# Patient Record
Sex: MALE | Race: WHITE | NOT HISPANIC OR LATINO | URBAN - METROPOLITAN AREA
[De-identification: names, ages, dates, MRNs, and addresses within clinical notes are randomized per-mention and may not be internally consistent; named-entity substitution may affect disease eponyms.]

---

## 2020-03-19 ENCOUNTER — EMERGENCY (EMERGENCY)
Facility: HOSPITAL | Age: 35
LOS: 1 days | Discharge: HOME | End: 2020-03-19
Attending: EMERGENCY MEDICINE
Payer: COMMERCIAL

## 2020-03-19 VITALS
SYSTOLIC BLOOD PRESSURE: 139 MMHG | DIASTOLIC BLOOD PRESSURE: 76 MMHG | RESPIRATION RATE: 18 BRPM | OXYGEN SATURATION: 98 % | HEART RATE: 115 BPM

## 2020-03-19 VITALS
DIASTOLIC BLOOD PRESSURE: 80 MMHG | HEART RATE: 90 BPM | RESPIRATION RATE: 17 BRPM | TEMPERATURE: 99 F | SYSTOLIC BLOOD PRESSURE: 121 MMHG | OXYGEN SATURATION: 100 %

## 2020-03-19 LAB
ALBUMIN SERPL ELPH-MCNC: 4.8 G/DL — SIGNIFICANT CHANGE UP (ref 3.3–5)
ALP SERPL-CCNC: 62 U/L — SIGNIFICANT CHANGE UP (ref 40–120)
ALT FLD-CCNC: 30 U/L — SIGNIFICANT CHANGE UP (ref 10–45)
ANION GAP SERPL CALC-SCNC: 16 MMOL/L — SIGNIFICANT CHANGE UP (ref 5–17)
AST SERPL-CCNC: 27 U/L — SIGNIFICANT CHANGE UP (ref 10–40)
BASOPHILS # BLD AUTO: 0.03 K/UL — SIGNIFICANT CHANGE UP (ref 0–0.2)
BASOPHILS NFR BLD AUTO: 0.4 % — SIGNIFICANT CHANGE UP (ref 0–2)
BILIRUB SERPL-MCNC: 0.4 MG/DL — SIGNIFICANT CHANGE UP (ref 0.2–1.2)
BUN SERPL-MCNC: 11 MG/DL — SIGNIFICANT CHANGE UP (ref 7–23)
CALCIUM SERPL-MCNC: 10.3 MG/DL — SIGNIFICANT CHANGE UP (ref 8.4–10.5)
CHLORIDE SERPL-SCNC: 97 MMOL/L — SIGNIFICANT CHANGE UP (ref 96–108)
CO2 SERPL-SCNC: 24 MMOL/L — SIGNIFICANT CHANGE UP (ref 22–31)
CREAT SERPL-MCNC: 1.14 MG/DL — SIGNIFICANT CHANGE UP (ref 0.5–1.3)
EOSINOPHIL # BLD AUTO: 0.03 K/UL — SIGNIFICANT CHANGE UP (ref 0–0.5)
EOSINOPHIL NFR BLD AUTO: 0.4 % — SIGNIFICANT CHANGE UP (ref 0–6)
GAS PNL BLDV: SIGNIFICANT CHANGE UP
GLUCOSE SERPL-MCNC: 97 MG/DL — SIGNIFICANT CHANGE UP (ref 70–99)
HCT VFR BLD CALC: 43.7 % — SIGNIFICANT CHANGE UP (ref 39–50)
HGB BLD-MCNC: 14.5 G/DL — SIGNIFICANT CHANGE UP (ref 13–17)
IMM GRANULOCYTES NFR BLD AUTO: 0.3 % — SIGNIFICANT CHANGE UP (ref 0–1.5)
LYMPHOCYTES # BLD AUTO: 0.56 K/UL — LOW (ref 1–3.3)
LYMPHOCYTES # BLD AUTO: 7.7 % — LOW (ref 13–44)
MCHC RBC-ENTMCNC: 30.3 PG — SIGNIFICANT CHANGE UP (ref 27–34)
MCHC RBC-ENTMCNC: 33.2 GM/DL — SIGNIFICANT CHANGE UP (ref 32–36)
MCV RBC AUTO: 91.2 FL — SIGNIFICANT CHANGE UP (ref 80–100)
MONOCYTES # BLD AUTO: 1.04 K/UL — HIGH (ref 0–0.9)
MONOCYTES NFR BLD AUTO: 14.3 % — HIGH (ref 2–14)
NEUTROPHILS # BLD AUTO: 5.57 K/UL — SIGNIFICANT CHANGE UP (ref 1.8–7.4)
NEUTROPHILS NFR BLD AUTO: 76.9 % — SIGNIFICANT CHANGE UP (ref 43–77)
NRBC # BLD: 0 /100 WBCS — SIGNIFICANT CHANGE UP (ref 0–0)
PLATELET # BLD AUTO: 170 K/UL — SIGNIFICANT CHANGE UP (ref 150–400)
POTASSIUM SERPL-MCNC: 3.5 MMOL/L — SIGNIFICANT CHANGE UP (ref 3.5–5.3)
POTASSIUM SERPL-SCNC: 3.5 MMOL/L — SIGNIFICANT CHANGE UP (ref 3.5–5.3)
PROT SERPL-MCNC: 7.9 G/DL — SIGNIFICANT CHANGE UP (ref 6–8.3)
RAPID RVP RESULT: SIGNIFICANT CHANGE UP
RBC # BLD: 4.79 M/UL — SIGNIFICANT CHANGE UP (ref 4.2–5.8)
RBC # FLD: 12.5 % — SIGNIFICANT CHANGE UP (ref 10.3–14.5)
SODIUM SERPL-SCNC: 137 MMOL/L — SIGNIFICANT CHANGE UP (ref 135–145)
WBC # BLD: 7.25 K/UL — SIGNIFICANT CHANGE UP (ref 3.8–10.5)
WBC # FLD AUTO: 7.25 K/UL — SIGNIFICANT CHANGE UP (ref 3.8–10.5)

## 2020-03-19 PROCEDURE — 82435 ASSAY OF BLOOD CHLORIDE: CPT

## 2020-03-19 PROCEDURE — 82330 ASSAY OF CALCIUM: CPT

## 2020-03-19 PROCEDURE — 85027 COMPLETE CBC AUTOMATED: CPT

## 2020-03-19 PROCEDURE — 71045 X-RAY EXAM CHEST 1 VIEW: CPT

## 2020-03-19 PROCEDURE — 83605 ASSAY OF LACTIC ACID: CPT

## 2020-03-19 PROCEDURE — 99284 EMERGENCY DEPT VISIT MOD MDM: CPT | Mod: 25

## 2020-03-19 PROCEDURE — 96361 HYDRATE IV INFUSION ADD-ON: CPT

## 2020-03-19 PROCEDURE — 99285 EMERGENCY DEPT VISIT HI MDM: CPT

## 2020-03-19 PROCEDURE — 84295 ASSAY OF SERUM SODIUM: CPT

## 2020-03-19 PROCEDURE — 87581 M.PNEUMON DNA AMP PROBE: CPT

## 2020-03-19 PROCEDURE — 80053 COMPREHEN METABOLIC PANEL: CPT

## 2020-03-19 PROCEDURE — 93010 ELECTROCARDIOGRAM REPORT: CPT

## 2020-03-19 PROCEDURE — 87486 CHLMYD PNEUM DNA AMP PROBE: CPT

## 2020-03-19 PROCEDURE — 84132 ASSAY OF SERUM POTASSIUM: CPT

## 2020-03-19 PROCEDURE — 93005 ELECTROCARDIOGRAM TRACING: CPT | Mod: 76

## 2020-03-19 PROCEDURE — 82803 BLOOD GASES ANY COMBINATION: CPT

## 2020-03-19 PROCEDURE — 85014 HEMATOCRIT: CPT

## 2020-03-19 PROCEDURE — 71045 X-RAY EXAM CHEST 1 VIEW: CPT | Mod: 26

## 2020-03-19 PROCEDURE — 82947 ASSAY GLUCOSE BLOOD QUANT: CPT

## 2020-03-19 PROCEDURE — 87798 DETECT AGENT NOS DNA AMP: CPT

## 2020-03-19 PROCEDURE — 87633 RESP VIRUS 12-25 TARGETS: CPT

## 2020-03-19 PROCEDURE — 96360 HYDRATION IV INFUSION INIT: CPT

## 2020-03-19 RX ORDER — ACETAMINOPHEN 500 MG
975 TABLET ORAL ONCE
Refills: 0 | Status: COMPLETED | OUTPATIENT
Start: 2020-03-19 | End: 2020-03-19

## 2020-03-19 RX ORDER — SODIUM CHLORIDE 9 MG/ML
1000 INJECTION, SOLUTION INTRAVENOUS ONCE
Refills: 0 | Status: COMPLETED | OUTPATIENT
Start: 2020-03-19 | End: 2020-03-19

## 2020-03-19 RX ORDER — SODIUM CHLORIDE 9 MG/ML
2000 INJECTION, SOLUTION INTRAVENOUS ONCE
Refills: 0 | Status: COMPLETED | OUTPATIENT
Start: 2020-03-19 | End: 2020-03-19

## 2020-03-19 RX ORDER — IBUPROFEN 200 MG
400 TABLET ORAL ONCE
Refills: 0 | Status: COMPLETED | OUTPATIENT
Start: 2020-03-19 | End: 2020-03-19

## 2020-03-19 RX ADMIN — SODIUM CHLORIDE 2000 MILLILITER(S): 9 INJECTION, SOLUTION INTRAVENOUS at 18:30

## 2020-03-19 RX ADMIN — Medication 400 MILLIGRAM(S): at 21:50

## 2020-03-19 RX ADMIN — Medication 975 MILLIGRAM(S): at 18:30

## 2020-03-19 RX ADMIN — SODIUM CHLORIDE 1000 MILLILITER(S): 9 INJECTION, SOLUTION INTRAVENOUS at 22:10

## 2020-03-19 RX ADMIN — SODIUM CHLORIDE 1000 MILLILITER(S): 9 INJECTION, SOLUTION INTRAVENOUS at 20:22

## 2020-03-19 RX ADMIN — SODIUM CHLORIDE 1000 MILLILITER(S): 9 INJECTION, SOLUTION INTRAVENOUS at 22:30

## 2020-03-19 RX ADMIN — Medication 975 MILLIGRAM(S): at 22:10

## 2020-03-19 NOTE — ED PROVIDER NOTE - ATTENDING CONTRIBUTION TO CARE
34yr otherwise healthy p/w fever and chest burn for 1-2 days. [place] last week [place] no sick contacts, has two kids at home. reports feeling fatigued, denies cp, sob, castañeda. denies n/v, abd pain, urinary sx. had slight non productive cough, no sorethroat. non smoker.   seen at urgent care for fever and tachycardia. here upon blood draw had an episode of 5 sec syncope and unresponsiveness but came around rapidly reports similar episode when in high school with seeing blood.  exam noted to be well appearing no distress, no tachypnea, but tachycardic. clear oropharynx, clear lungs S1-2, soft non tender abd no organomegaly. no peripheral edema.  pt may be COVID19 vs URI vs PNA. will do cxr, labs, ekg, iv fluids and antipyretics.  noted to be persistently tachycardic with IV bolus, does not correspond to his fever. plan to repeat ekg to rule out svt a tach. labs unremarkable. CXR non focal. if RVP positive, may go home with COVID19 precautions which was explained to him. otherwise may admit for monitoring.

## 2020-03-19 NOTE — ED PROVIDER NOTE - NSFOLLOWUPINSTRUCTIONS_ED_ALL_ED_FT
You are being testing for the COVID-19 virus. The tests performed for this and other viruses may not come back immediately. Here are some things you should know about the testing and treatment process in the meantime:    If you test positive for a different virus (for example, the flu) and the COVID test is negative, then a prolonged quarantine period is not necessary. You should stay home and avoid contact with others until you are without fever for at least 24 hours and your other symptoms are starting to improve.    If the tests for the other viruses are negative, then you will need to follow the quarantine procedures we discussed regardless of whether or not you test positive for COVID-19.    It is important for you to know that one negative test for COVID-19 is not enough to stop the quarantine procedures. If all the tests performed today are negative, you will still need to be quarantined at home.    We are recommending a 14 day quarantine. At the end of that time, you must still meet all of the following criteria:  - You have been without fever for at least 24 hours, without the use of medication to suppress fever  - Your other symptoms are improving  - If you tested positive for COVID-19, it has been at least 7 days since that test was performed    If any of the above are not true for you after the 14 day period (e.g. continued fevers) call your primary care doctor or the emergency department for additional guidance.    You should restrict the number of people in your home except for those who must absolutely be present (caregivers, children, spouse, etc). Pay special attention to avoid contact with those over the age of 65 or with significant chronic medical conditions (see below).    Illness severe enough to warrant to hospitalization can happen sometimes, even in people who were initially well enough to monitor themselves at home. You should return to the hospital immediately if you experience worsening shortness of breath, confusion, or any other new or concerning symptoms. If you are over the age of 65, or have chronic medical conditions such as chronic heart disease, asthma, COPD, kidney or liver problems, cancer, or take any drugs that suppress the immune system, you should have a low threshold for returning to the emergency department. You can take acetaminophen (aka tylenol, 1000 mg every 6-8 hours) for pain. Do not exceed 4000 mg acetaminophen (tylenol) in a 24 hour period. Be aware if any of your other medications contain acetaminophen so as not to exceed the maximum daily dose.    We are recommending a 14 day quarantine. At the end of that time, you must meet all of the following criteria:  - You have been without fever for at least 24 hours, without the use of medication to suppress fever  - Your other symptoms are improving  - If you tested positive for COVID-19, it has been at least 7 days since that test was performed    If any of the above are not true for you after the 14 day period (e.g. continued fevers) call your primary care doctor or the emergency department for additional guidance.    You should restrict the number of people in your home except for those who must absolutely be present (caregivers, children, spouse, etc). Pay special attention to avoid contact with those over the age of 65 or with significant chronic medical conditions (see below).    Illness severe enough to warrant to hospitalization can happen sometimes, even in people who were initially well enough to monitor themselves at home. You should return to the hospital immediately if you experience worsening shortness of breath, confusion, or any other new or concerning symptoms. If you are over the age of 65, or have chronic medical conditions such as chronic heart disease, asthma, COPD, kidney or liver problems, cancer, or take any drugs that suppress the immune system, you should have a low threshold for returning to the emergency department.

## 2020-03-19 NOTE — ED PROVIDER NOTE - OBJECTIVE STATEMENT
34M no sig PMH p/w a fever for 1 day. He reports Tmax = 100.1, associated w/ a burning substernal chest pain for 3 days and a cough for 2 days. He denies dyspnea. He was seen at an urgent care and referred here for the fever and tachycardia. He denies n/v/d.    Meds: none  All: NKDA

## 2020-03-19 NOTE — ED PROVIDER NOTE - PHYSICAL EXAMINATION
General: A&Ox3, well nourished, no acute distress  HENT: NC/AT. Posterior oropharynx clear. Patent airway  Eyes: PERRL, EOMI  CV: Tachycardiac, no m/r/g. 2+ peripheral pulses. Extremities are warm and well perfused.  Respiratory: CTAB no w/r/r. No respiratory distress.  Abdominal: soft, non-distended, non-tender, no rebound, guarding, or rigidity  Neuro: No focal deficits  Skin: no rashes  Psych: normal mood and affect

## 2020-03-19 NOTE — ED ADULT NURSE NOTE - OBJECTIVE STATEMENT
34y old male no PMH BIB EMS from urgent care d/t fever. A&Ox3. Patient states he returned from [place] yesterday, he has been having midsternal chest discomfort for two days, fever and "little cough this morning" that resolved, seen at [facility] tested negative for flu at office. Patient denies SOB, n/v/d, abdomen pain, urinary s/s, and any sick contact. Patient skin appears pale, dry and intact, febrile, lung sounds clear b/t, tachycardic, abdomen soft non-distended. Isolation precautions initiated, IV inserted, cardiac monitored.

## 2020-03-19 NOTE — ED PROVIDER NOTE - PROGRESS NOTE DETAILS
Jonathan Weil, PGY3 - well appearing on reassessment, states he feels well, still tachy. Will provide additional fluids. Jonathan Weil, PGY3 - HR down to 100-105. Will give one more fluid bolus and if he demonstrates further improvement then dc home. tachycardia resolved, labs notable for lymphopenia. will dc with instructions for self isolate with return precautions reviewed w patient. DJ Gutierrez PGY3: pt signed out to me, reassessed, nontoxic appearing, stable for dc home, will give quarantine precautions, all questions answered

## 2020-03-19 NOTE — ED PROVIDER NOTE - CLINICAL SUMMARY MEDICAL DECISION MAKING FREE TEXT BOX
Jonathan Weil, PGY3 - probably respiratory tract illness, viral more likely than bacterial. Will obtain CXR, RVP, provide fluids and anti-pyretics. Current algorithm at this facility dictates RVP only until decision on dispo - will swab for COVID-19 if he requires admission, but this can be avoided if his HR improves. Note: at time of evaluation patient had a syncopal episode while IV access was obtained. He became briefly bradycardiac to the 30s. This resolved spontaneously. Pt reports he has fainted before during blood draws.

## 2020-03-19 NOTE — ED PROVIDER NOTE - PATIENT PORTAL LINK FT
You can access the FollowMyHealth Patient Portal offered by [facility] by registering at the following website: [email or web address]. By joining [facility]’s FollowMyHealth portal, you will also be able to view your health information using other applications (apps) compatible with our system.

## 2020-03-20 NOTE — ED ADULT NURSE REASSESSMENT NOTE - NS ED NURSE REASSESS COMMENT FT1
Patient HR dropped down to 30 and became diaphoretic, MD at bedside, pacer pads applied, cardiac monitor in place, IV fluids administered, patient states "this usually happens when I get blood drawn". , patient A&Ox3, Will continue to monitor.
Received a 34M from [place] with no sig PMH p/w a fever for 1 day. He reports Tmax = 100.1, associated w/ a burning substernal chest pain for 3 days and a cough for 2 days. He denies dyspnea. He was seen at an urgent care and referred here for the fever and tachycardia. He denies n/v/d.  Patient was lined and labs resulted, VS within stable limits.  Patient instructed about viral illness and to self quarantine self for 2 weeks and take Tylenol and/or motrin for bodyaches and fever and hydrate self well and verbalized understanding.

## 2020-03-23 ENCOUNTER — TRANSCRIPTION ENCOUNTER (OUTPATIENT)
Age: 35
End: 2020-03-23

## 2020-04-07 ENCOUNTER — TRANSCRIPTION ENCOUNTER (OUTPATIENT)
Age: 35
End: 2020-04-07

## 2020-04-28 ENCOUNTER — TRANSCRIPTION ENCOUNTER (OUTPATIENT)
Age: 35
End: 2020-04-28

## 2021-05-01 ENCOUNTER — TRANSCRIPTION ENCOUNTER (OUTPATIENT)
Age: 36
End: 2021-05-01

## 2021-10-22 ENCOUNTER — APPOINTMENT (OUTPATIENT)
Dept: HUMAN REPRODUCTION | Facility: CLINIC | Age: 36
End: 2021-10-22

## 2021-10-25 ENCOUNTER — APPOINTMENT (OUTPATIENT)
Dept: HUMAN REPRODUCTION | Facility: CLINIC | Age: 36
End: 2021-10-25

## 2021-11-01 ENCOUNTER — APPOINTMENT (OUTPATIENT)
Dept: HUMAN REPRODUCTION | Facility: CLINIC | Age: 36
End: 2021-11-01

## 2022-10-04 ENCOUNTER — APPOINTMENT (OUTPATIENT)
Dept: HUMAN REPRODUCTION | Facility: CLINIC | Age: 37
End: 2022-10-04

## 2022-11-29 ENCOUNTER — APPOINTMENT (OUTPATIENT)
Dept: HUMAN REPRODUCTION | Facility: CLINIC | Age: 37
End: 2022-11-29

## 2022-11-29 PROCEDURE — ZZZZZ: CPT

## 2022-12-04 ENCOUNTER — APPOINTMENT (OUTPATIENT)
Dept: HUMAN REPRODUCTION | Facility: CLINIC | Age: 37
End: 2022-12-04

## 2024-07-20 ENCOUNTER — NON-APPOINTMENT (OUTPATIENT)
Age: 39
End: 2024-07-20

## 2025-02-10 ENCOUNTER — APPOINTMENT (OUTPATIENT)
Dept: HUMAN REPRODUCTION | Facility: CLINIC | Age: 40
End: 2025-02-10
Payer: COMMERCIAL

## 2025-02-10 PROCEDURE — ZZZZZ: CPT

## 2025-06-26 NOTE — ED ADULT NURSE NOTE - CHPI ED NUR DURATION
[FreeTextEntry1] : Patient has failed 6 weeks of conservative care, including physical therapy and medications. Will obtain C & L MRI to rule out HNP; will also be used to guide potential future injections/surgical management.    2/day(s)